# Patient Record
Sex: MALE | Race: WHITE | NOT HISPANIC OR LATINO | RURAL
[De-identification: names, ages, dates, MRNs, and addresses within clinical notes are randomized per-mention and may not be internally consistent; named-entity substitution may affect disease eponyms.]

---

## 2021-01-20 ENCOUNTER — APPOINTMENT (RX ONLY)
Dept: RURAL CLINIC 4 | Facility: CLINIC | Age: 21
Setting detail: DERMATOLOGY
End: 2021-01-20

## 2021-01-20 DIAGNOSIS — B07.8 OTHER VIRAL WARTS: ICD-10-CM

## 2021-01-20 DIAGNOSIS — D22 MELANOCYTIC NEVI: ICD-10-CM

## 2021-01-20 PROBLEM — D22.71 MELANOCYTIC NEVI OF RIGHT LOWER LIMB, INCLUDING HIP: Status: ACTIVE | Noted: 2021-01-20

## 2021-01-20 PROCEDURE — 17110 DESTRUCTION B9 LES UP TO 14: CPT

## 2021-01-20 PROCEDURE — ? LIQUID NITROGEN

## 2021-01-20 PROCEDURE — 99202 OFFICE O/P NEW SF 15 MIN: CPT | Mod: 25

## 2021-01-20 PROCEDURE — ? COUNSELING

## 2021-01-20 PROCEDURE — ? BENIGN DESTRUCTION

## 2021-01-20 ASSESSMENT — LOCATION ZONE DERM
LOCATION ZONE: FINGER
LOCATION ZONE: HAND
LOCATION ZONE: TOE

## 2021-01-20 ASSESSMENT — LOCATION SIMPLE DESCRIPTION DERM
LOCATION SIMPLE: RIGHT THUMB
LOCATION SIMPLE: RIGHT HAND
LOCATION SIMPLE: RIGHT 4TH TOE

## 2021-01-20 ASSESSMENT — LOCATION DETAILED DESCRIPTION DERM
LOCATION DETAILED: RIGHT RADIAL PALM
LOCATION DETAILED: RIGHT PROXIMAL RADIAL THUMB
LOCATION DETAILED: RIGHT DISTAL RADIAL THUMB
LOCATION DETAILED: RIGHT MEDIAL 4TH TOE

## 2021-01-20 NOTE — PROCEDURE: LIQUID NITROGEN
Consent: The patient's consent was obtained including but not limited to risks of crusting, scabbing, blistering, scarring, darker or lighter pigmentary change, recurrence, incomplete removal and infection.
Post-Care Instructions: I reviewed with the patient in detail post-care instructions. Patient is to wear sunprotection, and avoid picking at any of the treated lesions. Pt may apply Vaseline to crusted or scabbing areas.
Detail Level: Simple
Number Of Freeze-Thaw Cycles: 3 freeze-thaw cycles
Render Post-Care Instructions In Note?: no
Medical Necessity Clause: This procedure was medically necessary because the lesions that were treated were:
Include Z78.9 (Other Specified Conditions Influencing Health Status) As An Associated Diagnosis?: Yes
Medical Necessity Information: It is in your best interest to select a reason for this procedure from the list below. All of these items fulfill various CMS LCD requirements except the new and changing color options.

## 2021-01-20 NOTE — PROCEDURE: BENIGN DESTRUCTION
Post-Care Instructions: I reviewed with the patient in detail post-care instructions. Patient is to avoid picking at any of the treated lesions. Advised patient to apply Vaseline or Polysporin to crusted or scabbing areas, and keep covered until it heals.  Understands that it may take 3-4 weeks to heal.
Anesthesia Volume In Cc: 1.5
Render Post-Care Instructions In Note?: no
Medical Necessity Clause: This procedure was medically necessary because the lesions that were treated were:
Consent: The patient's consent was obtained including but not limited to risks of crusting, scabbing, blistering, scarring, darker or lighter pigmentary change, recurrence, incomplete removal and infection.
Medical Necessity Information: It is in your best interest to select a reason for this procedure from the list below. All of these items fulfill various CMS LCD requirements except the new and changing color options.
Treatment Number (Will Not Render If 0): 0
Detail Level: Simple
Include Z78.9 (Other Specified Conditions Influencing Health Status) As An Associated Diagnosis?: Yes